# Patient Record
Sex: FEMALE | Race: OTHER
[De-identification: names, ages, dates, MRNs, and addresses within clinical notes are randomized per-mention and may not be internally consistent; named-entity substitution may affect disease eponyms.]

---

## 2018-05-18 ENCOUNTER — HOSPITAL ENCOUNTER (EMERGENCY)
Dept: HOSPITAL 72 - EMR | Age: 83
Discharge: HOME | End: 2018-05-18
Payer: COMMERCIAL

## 2018-05-18 VITALS — DIASTOLIC BLOOD PRESSURE: 82 MMHG | SYSTOLIC BLOOD PRESSURE: 129 MMHG

## 2018-05-18 VITALS — BODY MASS INDEX: 18.14 KG/M2 | HEIGHT: 59 IN | WEIGHT: 90 LBS

## 2018-05-18 VITALS — DIASTOLIC BLOOD PRESSURE: 72 MMHG | SYSTOLIC BLOOD PRESSURE: 132 MMHG

## 2018-05-18 VITALS — SYSTOLIC BLOOD PRESSURE: 127 MMHG | DIASTOLIC BLOOD PRESSURE: 77 MMHG

## 2018-05-18 DIAGNOSIS — Z04.3: Primary | ICD-10-CM

## 2018-05-18 DIAGNOSIS — N28.9: ICD-10-CM

## 2018-05-18 DIAGNOSIS — R41.82: ICD-10-CM

## 2018-05-18 DIAGNOSIS — Z91.81: ICD-10-CM

## 2018-05-18 LAB
ADD MANUAL DIFF: NO
ALBUMIN SERPL-MCNC: 3.6 G/DL (ref 3.4–5)
ALBUMIN/GLOB SERPL: 0.9 {RATIO} (ref 1–2.7)
ALP SERPL-CCNC: 42 U/L (ref 46–116)
ALT SERPL-CCNC: 29 U/L (ref 12–78)
ANION GAP SERPL CALC-SCNC: 8 MMOL/L (ref 5–15)
APPEARANCE UR: CLEAR
APTT BLD: 30 SEC (ref 23–33)
APTT PPP: (no result) S
AST SERPL-CCNC: 34 U/L (ref 15–37)
BASOPHILS NFR BLD AUTO: 1 % (ref 0–2)
BILIRUB SERPL-MCNC: 0.5 MG/DL (ref 0.2–1)
BUN SERPL-MCNC: 31 MG/DL (ref 7–18)
CALCIUM SERPL-MCNC: 10.2 MG/DL (ref 8.5–10.1)
CHLORIDE SERPL-SCNC: 98 MMOL/L (ref 98–107)
CK SERPL-CCNC: 347 U/L (ref 26–308)
CO2 SERPL-SCNC: 29 MMOL/L (ref 21–32)
CREAT SERPL-MCNC: 1.7 MG/DL (ref 0.55–1.3)
EOSINOPHIL NFR BLD AUTO: 1 % (ref 0–3)
ERYTHROCYTE [DISTWIDTH] IN BLOOD BY AUTOMATED COUNT: 11.1 % (ref 11.6–14.8)
GLOBULIN SER-MCNC: 4 G/DL
GLUCOSE UR STRIP-MCNC: NEGATIVE MG/DL
HCT VFR BLD CALC: 43.4 % (ref 37–47)
HGB BLD-MCNC: 14.2 G/DL (ref 12–16)
INR PPP: 1.1 (ref 0.9–1.1)
KETONES UR QL STRIP: NEGATIVE
LEUKOCYTE ESTERASE UR QL STRIP: NEGATIVE
LYMPHOCYTES NFR BLD AUTO: 23.9 % (ref 20–45)
MCV RBC AUTO: 89 FL (ref 80–99)
MONOCYTES NFR BLD AUTO: 10.9 % (ref 1–10)
NEUTROPHILS NFR BLD AUTO: 63.2 % (ref 45–75)
NITRITE UR QL STRIP: NEGATIVE
PH UR STRIP: 5 [PH] (ref 4.5–8)
PLATELET # BLD: 280 K/UL (ref 150–450)
POTASSIUM SERPL-SCNC: 4.3 MMOL/L (ref 3.5–5.1)
PROT UR QL STRIP: NEGATIVE
RBC # BLD AUTO: 4.87 M/UL (ref 4.2–5.4)
SODIUM SERPL-SCNC: 135 MMOL/L (ref 136–145)
SP GR UR STRIP: 1.01 (ref 1–1.03)
UROBILINOGEN UR-MCNC: NORMAL MG/DL (ref 0–1)
WBC # BLD AUTO: 9.1 K/UL (ref 4.8–10.8)

## 2018-05-18 PROCEDURE — 99284 EMERGENCY DEPT VISIT MOD MDM: CPT

## 2018-05-18 PROCEDURE — 85025 COMPLETE CBC W/AUTO DIFF WBC: CPT

## 2018-05-18 PROCEDURE — 84484 ASSAY OF TROPONIN QUANT: CPT

## 2018-05-18 PROCEDURE — 82550 ASSAY OF CK (CPK): CPT

## 2018-05-18 PROCEDURE — 70450 CT HEAD/BRAIN W/O DYE: CPT

## 2018-05-18 PROCEDURE — 80053 COMPREHEN METABOLIC PANEL: CPT

## 2018-05-18 PROCEDURE — 93005 ELECTROCARDIOGRAM TRACING: CPT

## 2018-05-18 PROCEDURE — 85730 THROMBOPLASTIN TIME PARTIAL: CPT

## 2018-05-18 PROCEDURE — 85610 PROTHROMBIN TIME: CPT

## 2018-05-18 PROCEDURE — 36415 COLL VENOUS BLD VENIPUNCTURE: CPT

## 2018-05-18 PROCEDURE — 71045 X-RAY EXAM CHEST 1 VIEW: CPT

## 2018-05-18 PROCEDURE — 96374 THER/PROPH/DIAG INJ IV PUSH: CPT

## 2018-05-18 PROCEDURE — 81003 URINALYSIS AUTO W/O SCOPE: CPT

## 2018-05-18 NOTE — EMERGENCY ROOM REPORT
History of Present Illness


General


Chief Complaint:  Multiple Trauma/Fall


Source:  Patient





Present Illness


HPI


This patient presents from assisted living facility.  There was concern that 

she had fallen in the bathroom this morning.  The fall was not witnessed.  Per 

report, staff found the patient on the floor in the bathroom.  There is was no 

obvious trauma.  The patient herself has no complaints.  There is no other 

history available.


Allergies:  


Coded Allergies:  


     No Known Allergies (Unverified , 5/18/18)





Patient History


Past Medical History:  see triage record, dementia, renal disease, other - Gait 

instability


Social History:  Denies: smoking, alcohol use, drug use


Reviewed Nursing Documentation:  PMH: Agreed; PSxH: Agreed





Nursing Documentation-PMH


Past Medical History:  No History, Except For





Review of Systems


All Other Systems:  negative except mentioned in HPI





Physical Exam





Vital Signs








  Date Time  Temp Pulse Resp B/P (MAP) Pulse Ox O2 Delivery O2 Flow Rate FiO2


 


5/18/18 10:56 97.5 84 16 132/72 97 Room Air  





 97.5       








Sp02 EP Interpretation:  reviewed, normal


General Appearance:  no apparent distress, alert, GCS 15, non-toxic


Head:  normocephalic, atraumatic


Eyes:  bilateral eye normal inspection, bilateral eye PERRL


ENT:  hearing grossly normal, normal pharynx, no angioedema, normal voice


Neck:  full range of motion, supple/symm/no masses


Respiratory:  chest non-tender, lungs clear, normal breath sounds, speaking 

full sentences


Cardiovascular #1:  regular rate, rhythm, no edema


Gastrointestinal:  normal bowel sounds, non tender, soft, non-distended, no 

guarding, no rebound


Rectal:  deferred


Musculoskeletal:  back normal, gait/station normal, normal range of motion, non-

tender


Neurologic:  alert, oriented x3, responsive, motor strength/tone normal, 

sensory intact, speech normal


Psychiatric:  judgement/insight normal, memory normal, mood/affect normal, no 

suicidal/homicidal ideation


Skin:  normal color, no rash, warm/dry, well hydrated





Medical Decision Making


Diagnostic Impression:  


 Primary Impression:  


 Fall


 Additional Impression:  


 Renal insufficiency, mild


ER Course


This elderly female presents for evaluation after a fall in the bathroom.  She 

is a very well-appearing without any evidence of long bone or bony fracture of 

any sort.  My physical exam is benign.  There is no evidence of head trauma, 

however, given the patient's age I did obtain a CT of the head.  The patient's 

laboratory workup is at her baseline.  She does have renal insufficiency but 

this is chronic for her.  The patient's family is bedside.  At this time, I did 

not identify an emergency medical condition or significant injury from her 

fall.  She is returned to the assisted living facility.





Laboratory Tests








Test


  5/18/18


11:30 5/18/18


12:10


 


White Blood Count


  9.1 K/UL


(4.8-10.8) 


 


 


Red Blood Count


  4.87 M/UL


(4.20-5.40) 


 


 


Hemoglobin


  14.2 G/DL


(12.0-16.0) 


 


 


Hematocrit


  43.4 %


(37.0-47.0) 


 


 


Mean Corpuscular Volume 89 FL (80-99)   


 


Mean Corpuscular Hemoglobin


  29.1 PG


(27.0-31.0) 


 


 


Mean Corpuscular Hemoglobin


Concent 32.6 G/DL


(32.0-36.0) 


 


 


Red Cell Distribution Width


  11.1 %


(11.6-14.8)  L 


 


 


Platelet Count


  280 K/UL


(150-450) 


 


 


Mean Platelet Volume


  6.7 FL


(6.5-10.1) 


 


 


Neutrophils (%) (Auto)


  63.2 %


(45.0-75.0) 


 


 


Lymphocytes (%) (Auto)


  23.9 %


(20.0-45.0) 


 


 


Monocytes (%) (Auto)


  10.9 %


(1.0-10.0)  H 


 


 


Eosinophils (%) (Auto)


  1.0 %


(0.0-3.0) 


 


 


Basophils (%) (Auto)


  1.0 %


(0.0-2.0) 


 


 


Prothrombin Time


  12.0 SEC


(9.30-11.50)  H 


 


 


Prothrombin Time INR 1.1 (0.9-1.1)   


 


PTT


  30 SEC (23-33)


  


 


 


Sodium Level


  135 MMOL/L


(136-145)  L 


 


 


Potassium Level


  4.3 MMOL/L


(3.5-5.1) 


 


 


Chloride Level


  98 MMOL/L


() 


 


 


Carbon Dioxide Level


  29 MMOL/L


(21-32) 


 


 


Anion Gap


  8 mmol/L


(5-15) 


 


 


Blood Urea Nitrogen


  31 mg/dL


(7-18)  H 


 


 


Creatinine


  1.7 MG/DL


(0.55-1.30)  H 


 


 


Estimate Glomerular


Filtration Rate  mL/min (>60)  


  


 


 


Glucose Level


  134 MG/DL


()  H 


 


 


Calcium Level


  10.2 MG/DL


(8.5-10.1)  H 


 


 


Total Bilirubin


  0.5 MG/DL


(0.2-1.0) 


 


 


Aspartate Amino Transferase


(AST) 34 U/L (15-37)


  


 


 


Alanine Aminotransferase (ALT)


  29 U/L (12-78)


  


 


 


Alkaline Phosphatase


  42 U/L


()  L 


 


 


Total Creatine Kinase


  347 U/L


()  H 


 


 


Troponin I


  0.000 ng/mL


(0.000-0.056) 


 


 


Total Protein


  7.6 G/DL


(6.4-8.2) 


 


 


Albumin


  3.6 G/DL


(3.4-5.0) 


 


 


Globulin 4.0 g/dL   


 


Albumin/Globulin Ratio


  0.9 (1.0-2.7)


L 


 


 


Urine Color  Pale yellow  


 


Urine Appearance  Clear  


 


Urine pH  5 (4.5-8.0)  


 


Urine Specific Gravity


  


  1.015


(1.005-1.035)


 


Urine Protein


  


  Negative


(NEGATIVE)


 


Urine Glucose (UA)


  


  Negative


(NEGATIVE)


 


Urine Ketones


  


  Negative


(NEGATIVE)


 


Urine Occult Blood


  


  Negative


(NEGATIVE)


 


Urine Nitrite


  


  Negative


(NEGATIVE)


 


Urine Bilirubin


  


  Negative


(NEGATIVE)


 


Urine Urobilinogen


  


  Normal MG/DL


(0.0-1.0)


 


Urine Leukocyte Esterase


  


  Negative


(NEGATIVE)








EKG Diagnostic Results


Rate:  normal


Rhythm:  NSR


ST Segments:  no acute changes





Rhythm Strip Diag. Results


EP Interpretation:  yes


Rate:  80's


Rhythm:  NSR, no PVC's, no ectopy





CT/MRI/US Diagnostic Results


CT/MRI/US Diagnostic Results :  


   Imaging Test Ordered:  CT head


   Impression


No acute findings.  See official report for incidental findings.





Last Vital Signs








  Date Time  Temp Pulse Resp B/P (MAP) Pulse Ox O2 Delivery O2 Flow Rate FiO2


 


5/18/18 11:14 97.5  16 132/72 97 Room Air  





 97.5       


 


5/18/18 10:56  84      








Status:  improved


Disposition:  HOME, SELF-CARE


Condition:  Improved


Referrals:  


NON PHYSICIAN (PCP)











CHIDI HERNANDEZ D.O. May 18, 2018 14:31

## 2018-05-21 NOTE — CARDIOLOGY REPORT
--------------- APPROVED REPORT --------------





EKG Measurement

Heart [id number]

CA [id number]

[id number]

[id number]

[id number]





Normal sinus rhythm

Possible Inferior infarct, age undetermined

Abnormal ECG

## 2020-02-12 ENCOUNTER — HOSPITAL ENCOUNTER (EMERGENCY)
Dept: HOSPITAL 72 - EMR | Age: 85
Discharge: NURSING, REHAB OR LONG-TERM CARE | End: 2020-02-12
Payer: COMMERCIAL

## 2020-02-12 VITALS — DIASTOLIC BLOOD PRESSURE: 61 MMHG | SYSTOLIC BLOOD PRESSURE: 127 MMHG

## 2020-02-12 VITALS — SYSTOLIC BLOOD PRESSURE: 121 MMHG | DIASTOLIC BLOOD PRESSURE: 60 MMHG

## 2020-02-12 VITALS — HEIGHT: 64 IN | BODY MASS INDEX: 12.8 KG/M2 | WEIGHT: 75 LBS

## 2020-02-12 DIAGNOSIS — F03.90: ICD-10-CM
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
## 2020-02-12 DIAGNOSIS — M54.5: ICD-10-CM

## 2020-02-12 DIAGNOSIS — N30.00: ICD-10-CM

## 2020-02-12 DIAGNOSIS — M19.90: ICD-10-CM
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
## 2020-02-12 DIAGNOSIS — G89.29: Primary | ICD-10-CM

## 2020-02-12 DIAGNOSIS — R91.8: ICD-10-CM

## 2020-02-12 LAB
ADD MANUAL DIFF: NO
ALBUMIN SERPL-MCNC: 3.3 G/DL (ref 3.4–5)
ALBUMIN/GLOB SERPL: 0.8 {RATIO} (ref 1–2.7)
ALP SERPL-CCNC: 36 U/L (ref 46–116)
ALT SERPL-CCNC: 23 U/L (ref 12–78)
ANION GAP SERPL CALC-SCNC: 9 MMOL/L (ref 5–15)
APPEARANCE UR: CLEAR
APTT BLD: 27 SEC (ref 23–33)
APTT PPP: (no result) S
AST SERPL-CCNC: 31 U/L (ref 15–37)
BASOPHILS NFR BLD AUTO: 0.6 % (ref 0–2)
BILIRUB SERPL-MCNC: 0.5 MG/DL (ref 0.2–1)
BUN SERPL-MCNC: 31 MG/DL (ref 7–18)
CALCIUM SERPL-MCNC: 9.2 MG/DL (ref 8.5–10.1)
CHLORIDE SERPL-SCNC: 107 MMOL/L (ref 98–107)
CO2 SERPL-SCNC: 24 MMOL/L (ref 21–32)
CREAT SERPL-MCNC: 1.4 MG/DL (ref 0.55–1.3)
EOSINOPHIL NFR BLD AUTO: 1.6 % (ref 0–3)
ERYTHROCYTE [DISTWIDTH] IN BLOOD BY AUTOMATED COUNT: 11.8 % (ref 11.6–14.8)
GLOBULIN SER-MCNC: 4.2 G/DL
GLUCOSE UR STRIP-MCNC: NEGATIVE MG/DL
HCT VFR BLD CALC: 43.4 % (ref 37–47)
HGB BLD-MCNC: 14.9 G/DL (ref 12–16)
INR PPP: 1.1 (ref 0.9–1.1)
KETONES UR QL STRIP: NEGATIVE
LEUKOCYTE ESTERASE UR QL STRIP: (no result)
LYMPHOCYTES NFR BLD AUTO: 25.3 % (ref 20–45)
MCV RBC AUTO: 90 FL (ref 80–99)
MONOCYTES NFR BLD AUTO: 7.9 % (ref 1–10)
NEUTROPHILS NFR BLD AUTO: 64.6 % (ref 45–75)
NITRITE UR QL STRIP: POSITIVE
PH UR STRIP: 5 [PH] (ref 4.5–8)
PLATELET # BLD: 246 K/UL (ref 150–450)
POTASSIUM SERPL-SCNC: 4.3 MMOL/L (ref 3.5–5.1)
PROT UR QL STRIP: (no result)
RBC # BLD AUTO: 4.81 M/UL (ref 4.2–5.4)
SODIUM SERPL-SCNC: 140 MMOL/L (ref 136–145)
SP GR UR STRIP: 1.02 (ref 1–1.03)
UROBILINOGEN UR-MCNC: NORMAL MG/DL (ref 0–1)
WBC # BLD AUTO: 9.7 K/UL (ref 4.8–10.8)

## 2020-02-12 PROCEDURE — 81003 URINALYSIS AUTO W/O SCOPE: CPT

## 2020-02-12 PROCEDURE — 74176 CT ABD & PELVIS W/O CONTRAST: CPT

## 2020-02-12 PROCEDURE — 80053 COMPREHEN METABOLIC PANEL: CPT

## 2020-02-12 PROCEDURE — 87181 SC STD AGAR DILUTION PER AGT: CPT

## 2020-02-12 PROCEDURE — 36415 COLL VENOUS BLD VENIPUNCTURE: CPT

## 2020-02-12 PROCEDURE — 85025 COMPLETE CBC W/AUTO DIFF WBC: CPT

## 2020-02-12 PROCEDURE — 83690 ASSAY OF LIPASE: CPT

## 2020-02-12 PROCEDURE — 70450 CT HEAD/BRAIN W/O DYE: CPT

## 2020-02-12 PROCEDURE — 87086 URINE CULTURE/COLONY COUNT: CPT

## 2020-02-12 PROCEDURE — 71045 X-RAY EXAM CHEST 1 VIEW: CPT

## 2020-02-12 PROCEDURE — 99284 EMERGENCY DEPT VISIT MOD MDM: CPT

## 2020-02-12 PROCEDURE — 96365 THER/PROPH/DIAG IV INF INIT: CPT

## 2020-02-12 PROCEDURE — 96361 HYDRATE IV INFUSION ADD-ON: CPT

## 2020-02-12 PROCEDURE — 85730 THROMBOPLASTIN TIME PARTIAL: CPT

## 2020-02-12 PROCEDURE — 85610 PROTHROMBIN TIME: CPT

## 2020-02-12 PROCEDURE — 96375 TX/PRO/DX INJ NEW DRUG ADDON: CPT

## 2020-02-12 NOTE — NUR
ED Nurse Note:

patient brought into ED from [facility] by ambulance 
[facility] unit [facility], per sons, patient is s/p fall and c/o back pain. per son, 
patient has been having multiple falls recently. patient is alert awake, on a 
hospital gown, on a cardiac monitor. 

maria teresa number: [phone]

## 2020-02-12 NOTE — DIAGNOSTIC IMAGING REPORT
Indications: Mechanical fall, head trauma, dementia,

 

Technique: Spiral acquisitions obtained through the brain. Angled axial and coronal 5

x 5 mm slices were reconstructed. Total dose length product 1018 mGycm.  CTDI vol(s)

53 mGy. Dose reduction achieved using automated exposure control

 

Comparison: 5/18/2018

 

Findings: Again demonstrated is age-related enlargement of the ventricles and extra

axial CSF spaces. There is periventricular deep white matter low-attenuation,

consistent with chronic microvascular ischemic change. Unusual extensive very dense

mostly extra-axial punctate densities are again noted, particularly at the skull

base. In the foramen magnum.. No acute intracranial hemorrhage or edema. No mass

effect nor midline shift. Visualized orbits are unremarkable. There is sphenoid sinus

disease. The mastoids are clear. The calvarium is intact. There is no significant

interim change

 

Impression: Chronic and age-related changes

 

Negative for acute intracranial bleed or mass effect

 

Unusual extensive mostly extra-axial densities, as described. While possibly

representing meningeal calcifications, this could also represent fat soluble

contrast, deposits from prior myelography

 

 

 

 

 

The CT scanner at [facility] is accredited by the American College of

Radiology and the scans are performed using protocols designed to limit radiation

exposure to as low as reasonably achievable to attain images of sufficient resolution

adequate for diagnostic evaluation.

## 2020-02-12 NOTE — EMERGENCY ROOM REPORT
History of Present Illness


General


Chief Complaint:  Multiple Trauma/Fall


Source:  Patient, Medical Record, EMS





Present Illness


HPI


84-year-old female presents with mechanical fall, history is limited patient 

has a history of many falls, dementia, is alert and oriented x1, apparently 

fell yesterday, unknown if patient hit head, patient is only complaining of 

back pain and points at the left lower back no known aggravating relieving 

factors severity is mild, intermittent patient is able to ambulate patient 

presents from nursing home with her sons


Allergies:  


Coded Allergies:  


     No Known Allergies (Unverified , 5/18/18)





Patient History


Limited by:  medical condition - Dementia


Past Medical History:  see triage record


Last Menstrual Period:  na


Reviewed Nursing Documentation:  PMH: Agreed; PSxH: Agreed





Nursing Documentation-PMH


Past Medical History:  No History, Except For


Hx Cardiac Problems:  No - osteoarthritis





Review of Systems


All Other Systems:  limited - Dementia





Physical Exam





Vital Signs








  Date Time  Temp Pulse Resp B/P (MAP) Pulse Ox O2 Delivery O2 Flow Rate FiO2


 


2/12/20 11:47 98.2 91 19 120/70 (87) 97 Room Air  








Sp02 EP Interpretation:  reviewed, normal


General Appearance:  well appearing, no apparent distress, alert


Head:  normocephalic, atraumatic


Eyes:  bilateral eye PERRL, bilateral eye EOMI


ENT:  uvula midline, moist mucus membranes


Neck:  supple, thyroid normal, no bony tend, supple/symm/no masses


Respiratory:  lungs clear, no respiratory distress, no retraction, no accessory 

muscle use


Cardiovascular #1:  normal peripheral pulses, regular rate, rhythm, no edema, 

no gallop, no murmur


Gastrointestinal:  non tender, soft, no guarding, no rebound


Musculoskeletal:  other - Left lower  to palpation no midline 

tenderness no step-offs


Neurologic:  alert, responsive - Responsive but confused


Psychiatric:  mood/affect normal


Skin:  no rash, warm/dry





Medical Decision Making


Diagnostic Impression:  


 Primary Impression:  


 Fall


 Qualified Codes:  W19.XXXA - Unspecified fall, initial encounter


 Additional Impressions:  


 Low back pain


 Qualified Codes:  M54.5 - Low back pain; G89.29 - Other chronic pain


 Lung nodule, multiple


 UTI (urinary tract infection)


 Qualified Codes:  N30.00 - Acute cystitis without hematuria


ER Course


84-year-old female history of dementia history of multiple falls in the past 

presents with mechanical fall left lower back pain not midline differential 

diagnosis includes contusion, fracture, dislocation


Labs show UTI, patient with incidental lung nodule findings, patient's son was 

made aware


CT imaging negative will discharge patient back to SNF


Dara MONTANO spoke with kendy Early at 3:07pm, son wants patient discharged to 

specific SNF, niece and son state they want ER to arrange placement into new 

SNF location, sons were counseled that she can be discharged back to assisted 

living, and that she can be transferred to a SNF of their choosing as an 

outpatient. 


Disposition home with return precautions follow-up with PCP





Laboratory Tests








Test


  2/12/20


12:00 2/12/20


12:10


 


White Blood Count


  9.7 K/UL


(4.8-10.8) 


 


 


Red Blood Count


  4.81 M/UL


(4.20-5.40) 


 


 


Hemoglobin


  14.9 G/DL


(12.0-16.0) 


 


 


Hematocrit


  43.4 %


(37.0-47.0) 


 


 


Mean Corpuscular Volume 90 FL (80-99)   


 


Mean Corpuscular Hemoglobin


  31.1 PG


(27.0-31.0)  H 


 


 


Mean Corpuscular Hemoglobin


Concent 34.5 G/DL


(32.0-36.0) 


 


 


Red Cell Distribution Width


  11.8 %


(11.6-14.8) 


 


 


Platelet Count


  246 K/UL


(150-450) 


 


 


Mean Platelet Volume


  7.0 FL


(6.5-10.1) 


 


 


Neutrophils (%) (Auto)


  64.6 %


(45.0-75.0) 


 


 


Lymphocytes (%) (Auto)


  25.3 %


(20.0-45.0) 


 


 


Monocytes (%) (Auto)


  7.9 %


(1.0-10.0) 


 


 


Eosinophils (%) (Auto)


  1.6 %


(0.0-3.0) 


 


 


Basophils (%) (Auto)


  0.6 %


(0.0-2.0) 


 


 


Prothrombin Time


  11.4 SEC


(9.30-11.50) 


 


 


Prothrombin Time INR 1.1 (0.9-1.1)   


 


Activated Partial


Thromboplast Time 27 SEC (23-33)


  


 


 


Sodium Level


  140 MMOL/L


(136-145) 


 


 


Potassium Level


  4.3 MMOL/L


(3.5-5.1) 


 


 


Chloride Level


  107 MMOL/L


() 


 


 


Carbon Dioxide Level


  24 MMOL/L


(21-32) 


 


 


Anion Gap


  9 mmol/L


(5-15) 


 


 


Blood Urea Nitrogen


  31 mg/dL


(7-18)  H 


 


 


Creatinine


  1.4 MG/DL


(0.55-1.30)  H 


 


 


Estimate Glomerular


Filtration Rate 35.9 mL/min


(>60) 


 


 


Glucose Level


  87 MG/DL


() 


 


 


Calcium Level


  9.2 MG/DL


(8.5-10.1) 


 


 


Total Bilirubin


  0.5 MG/DL


(0.2-1.0) 


 


 


Aspartate Amino Transferase


(AST) 31 U/L (15-37)


  


 


 


Alanine Aminotransferase (ALT)


  23 U/L (12-78)


  


 


 


Alkaline Phosphatase


  36 U/L


()  L 


 


 


Total Protein


  7.5 G/DL


(6.4-8.2) 


 


 


Albumin


  3.3 G/DL


(3.4-5.0)  L 


 


 


Globulin 4.2 g/dL   


 


Albumin/Globulin Ratio


  0.8 (1.0-2.7)


L 


 


 


Lipase


  142 U/L


() 


 


 


Urine Color  Gail  


 


Urine Appearance  Clear  


 


Urine pH  5 (4.5-8.0)  


 


Urine Specific Gravity


  


  1.020


(1.005-1.035)


 


Urine Protein


  


  1+ (NEGATIVE)


H


 


Urine Glucose (UA)


  


  Negative


(NEGATIVE)


 


Urine Ketones


  


  Negative


(NEGATIVE)


 


Urine Blood


  


  2+ (NEGATIVE)


H


 


Urine Nitrite


  


  Positive


(NEGATIVE)  H


 


Urine Bilirubin


  


  Negative


(NEGATIVE)


 


Urine Ictotest


  


  Negative


(NEGATIVE)


 


Urine Urobilinogen


  


  Normal MG/DL


(0.0-1.0)


 


Urine Leukocyte Esterase


  


  1+ (NEGATIVE)


H


 


Urine RBC


  


  0-2 /HPF (0 -


2)


 


Urine WBC


  


  5-10 /HPF (0 -


2)  H


 


Urine Squamous Epithelial


Cells 


  Occasional


/LPF


 


Urine Bacteria


  


  Many /HPF


(NONE)  H








CT/MRI/US Diagnostic Results


CT/MRI/US Diagnostic Results :  


   Impression


Procedure: CT Abdomen Pelvis WO Contrast


Indication: Pain, trauma, fall


 


Technique: Spiral acquisitions obtained through the abdomen and pelvis. No oral


contrast utilized, per emergency room physician request No IV contrast utilized

,  per


referring physician request.. Multiplanar reconstructions were generated. Total 

dose


length product 143 mGycm. CTDIvol(s) 3 mGy. Dose reduction achieved using 

automated


exposure control


 


 


Comparison: None


 


Findings: The bones demonstrate no evidence of fracture. There is stranding of 

the


subcutaneous fat in the left hip region, could represent an area of soft tissue


contusion. No retroperitoneal, intra-abdominal, or intrapelvic hematoma 

demonstrated.


There is thoracolumbar scoliotic deformity and degenerative lumbosacral 

spondylosis.


High attenuation material is seen in the posterior sacral epidural space, likely


representing residual fat-soluble contrast from prior myelographic injection.


Scattered foci of similar-appearing material are also seen within the spinal 

canal


 


Lack of IV contrast limits assessment of the solid organs. The liver is grossly


unremarkable. The gallbladder is nondistended. No biliary ductal dilatation. The


pancreas, spleen, adrenals, kidneys are unremarkable. No renal or ureteral 

calculi,


hydronephrosis, or hydroureter demonstrated. No pelvic mass or adenopathy. The 

uterus


and adnexal structures are unremarkable. The bladder contains a Coelho catheter.


However, the bladder is mildly distended despite the presence of the Coelho 

catheter.


 


The rectum is mildly to moderately distended with stool, measures up to 7 cm in


diameter. There are a few scattered colonic diverticula. No evidence of acute


diverticulitis demonstrated. The appendix is normal. No small bowel distention. 

No


free or loculated intraperitoneal gas or fluid is evident. The distal esophagus 

and


stomach are unremarkable. There is a duodenal diverticulum. There is some 

increased


attenuation of the fat of the mesenteric root to the left of midline, and 

prominent


lymph nodes are seen in this area. This does not appear to be associated with 

the


pancreatic tail. There is an accessory splenule noted.


 


The included lung bases demonstrate posterior dependent atelectatic changes.


 


Impression: No acute bony, soft tissue, or solid organ trauma demonstrated. Note

,


however, that assessment for the latter is somewhat limited in the absence of IV


contrast


 


Increased attenuation of the fat of the mesenteric root to the left of midline. 

This


could represent an area of inflammation, nonspecific as regards etiology. 

Dominant


lymph nodes in the same area may reflect associated reactive lymphadenopathy.


 


Mild rectal distention with stool, rectal fecal impaction not excludable


 


Coelho catheter in place. Distention of the bladder despite the presence of the 

Coelho


catheter


 


Lumbar scoliotic deformity and degenerative lumbosacral spondylosis


 


High attenuation material within the sacral epidural space and within the 

thoracic


and lumbar spinal canal, likely residual contrast from prior myelographic 

injection


with fat soluble contrast


 


Incidental findings as noted, including posterior dependent pulmonary 

atelectatic


changes, accessory splenule, duodenal diverticulum


 


 


 


The CT scanner at [facility] is accredited by the American College 

of


Radiology and the scans are performed using protocols designed to limit 

radiation


exposure to as low as reasonably achievable to attain images of sufficient 

resolution


adequate for diagnostic evaluation.


 














Dictated By: William Gould MD   


Electronically Signed By: William Gould MD   


Signed Date/Time 02/12/20 1449   








CC: Alejandro Zapata MD








Procedure: CT Head no Contrast


Indications: Mechanical fall, head trauma, dementia,


 


Technique: Spiral acquisitions obtained through the brain. Angled axial and 

coronal 5


x 5 mm slices were reconstructed. Total dose length product 1018 mGycm.  CTDI 

vol(s)


53 mGy. Dose reduction achieved using automated exposure control


 


Comparison: 5/18/2018


 


Findings: Again demonstrated is age-related enlargement of the ventricles and 

extra


axial CSF spaces. There is periventricular deep white matter low-attenuation,


consistent with chronic microvascular ischemic change. Unusual extensive very 

dense


mostly extra-axial punctate densities are again noted, particularly at the skull


base. In the foramen magnum.. No acute intracranial hemorrhage or edema. No mass


effect nor midline shift. Visualized orbits are unremarkable. There is sphenoid 

sinus


disease. The mastoids are clear. The calvarium is intact. There is no 

significant


interim change


 


Impression: Chronic and age-related changes


 


Negative for acute intracranial bleed or mass effect


 


Unusual extensive mostly extra-axial densities, as described. While possibly


representing meningeal calcifications, this could also represent fat soluble


contrast, deposits from prior myelography


 


 


 


 


 


The CT scanner at [facility] is accredited by the American College 

of


Radiology and the scans are performed using protocols designed to limit 

radiation


exposure to as low as reasonably achievable to attain images of sufficient 

resolution


adequate for diagnostic evaluation.


 














Dictated By: William Gould MD   


Electronically Signed By: William Gould MD   


Signed Date/Time 02/12/20 142   








CC: Alejandro Zapata MD





Last Vital Signs








  Date Time  Temp Pulse Resp B/P (MAP) Pulse Ox O2 Delivery O2 Flow Rate FiO2


 


2/12/20 11:47 98.2 91 19 120/70 (87) 97 Room Air  








Disposition:  XFER SNF


Condition:  Stable


Scripts


Acetaminophen* (ACETAMINOPHEN EXTRA STRENGTH*) 500 Mg Tablet


500 MG ORAL Q6H PRN for For Pain, #20 TAB


   Prov: Alejandro Zapata MD         2/12/20 


Lidocaine Patch* (Lidoderm Patch*) 1 Each Adh..patch


1 PATCH TOPIC DAILY, #7 PATCH 0 Refills


   Patch(es) may remain in place for up to 12 hours in any 24-hour


   period.


   Prov: Alejandro Zapata MD         2/12/20 


Cephalexin* (KEFLEX*) 500 Mg Capsule


500 MG ORAL EVERY 6 HOURS, #28 CAP


   Prov: Alejandro Zapata MD         2/12/20


Referrals:  


[facility]





[facility]


Patient Instructions:  Back Pain, Adult, Easy-to-Read, Urinary Tract Infection, 

Easy-to-Read





Additional Instructions:  


The patient was provided with discharge instructions, notified to follow-up 

with a primary care doctor and or specialist in the next 24-48 hours, and to 

return to the ED if they have worsening of their symptoms. 





Please note that this report is being documented using DRAGON technology.


This can lead to erroneous entry secondary to incorrect interpretation by the 

dictating instrument. 

















Procedure: XRAY Chest 1v


Indication: Chest


 


Technique: One view of the chest


 


Comparison: 11/30/2019


 


Findings: There are 2 nodules in the right lung, each measuring approximately 1 

cm


diameter, not evident previously. Along the pleural spaces are otherwise clear. 

The


heart size is normal. The aorta is calcified


 


Impression: 2 right lung nodules, not evident on prior 11/30/2019 study. 

Recommend CT


for further evaluation 








PLEASE OBTAIN OUTPATIENT CT CHEST NON EMERGENT TO EVALUATE NODULES











Alejandro Zapata MD Feb 12, 2020 12:08

## 2020-02-12 NOTE — NUR
ER DISCHARGE NOTE:

Patient is cleared to be discharged per ELI FLANAGAN, pt is aox4, on room 
air, with stable vital signs. pt was given dc and prescription instructions, 
faxed to Paola MONTANO to [facility] and also given to [facility] EMTs.  
pt id band and iv site removed without complications. pt took all belongings.

## 2020-02-12 NOTE — DIAGNOSTIC IMAGING REPORT
Indication: Chest

 

Technique: One view of the chest

 

Comparison: 11/30/2019

 

Findings: There are 2 nodules in the right lung, each measuring approximately 1 cm

diameter, not evident previously. Along the pleural spaces are otherwise clear. The

heart size is normal. The aorta is calcified

 

Impression: 2 right lung nodules, not evident on prior 11/30/2019 study. Recommend CT

for further evaluation

 

No acute process otherwise

## 2020-02-12 NOTE — NUR
ED Nurse Note:



patient is being transferred via [facility] ambulance Unit # [id number] to [facility] with all of her belongings, in stable condition.

report given to Paola Cunha that patient is going back to facility. endorsed all 
plan of care to Paola CUNHA.

## 2020-02-12 NOTE — DIAGNOSTIC IMAGING REPORT
Indication: Pain, trauma, fall

 

Technique: Spiral acquisitions obtained through the abdomen and pelvis. No oral

contrast utilized, per emergency room physician request No IV contrast utilized,  per

referring physician request.. Multiplanar reconstructions were generated. Total dose

length product 143 mGycm. CTDIvol(s) 3 mGy. Dose reduction achieved using automated

exposure control

 

 

Comparison: None

 

Findings: The bones demonstrate no evidence of fracture. There is stranding of the

subcutaneous fat in the left hip region, could represent an area of soft tissue

contusion. No retroperitoneal, intra-abdominal, or intrapelvic hematoma demonstrated.

There is thoracolumbar scoliotic deformity and degenerative lumbosacral spondylosis.

High attenuation material is seen in the posterior sacral epidural space, likely

representing residual fat-soluble contrast from prior myelographic injection.

Scattered foci of similar-appearing material are also seen within the spinal canal

 

Lack of IV contrast limits assessment of the solid organs. The liver is grossly

unremarkable. The gallbladder is nondistended. No biliary ductal dilatation. The

pancreas, spleen, adrenals, kidneys are unremarkable. No renal or ureteral calculi,

hydronephrosis, or hydroureter demonstrated. No pelvic mass or adenopathy. The uterus

and adnexal structures are unremarkable. The bladder contains a Coelho catheter.

However, the bladder is mildly distended despite the presence of the Coelho catheter.

 

The rectum is mildly to moderately distended with stool, measures up to 7 cm in

diameter. There are a few scattered colonic diverticula. No evidence of acute

diverticulitis demonstrated. The appendix is normal. No small bowel distention. No

free or loculated intraperitoneal gas or fluid is evident. The distal esophagus and

stomach are unremarkable. There is a duodenal diverticulum. There is some increased

attenuation of the fat of the mesenteric root to the left of midline, and prominent

lymph nodes are seen in this area. This does not appear to be associated with the

pancreatic tail. There is an accessory splenule noted.

 

The included lung bases demonstrate posterior dependent atelectatic changes.

 

Impression: No acute bony, soft tissue, or solid organ trauma demonstrated. Note,

however, that assessment for the latter is somewhat limited in the absence of IV

contrast

 

Increased attenuation of the fat of the mesenteric root to the left of midline. This

could represent an area of inflammation, nonspecific as regards etiology. Dominant

lymph nodes in the same area may reflect associated reactive lymphadenopathy.

 

Mild rectal distention with stool, rectal fecal impaction not excludable

 

Coelho catheter in place. Distention of the bladder despite the presence of the Coelho

catheter

 

Lumbar scoliotic deformity and degenerative lumbosacral spondylosis

 

High attenuation material within the sacral epidural space and within the thoracic

and lumbar spinal canal, likely residual contrast from prior myelographic injection

with fat soluble contrast

 

Incidental findings as noted, including posterior dependent pulmonary atelectatic

changes, accessory splenule, duodenal diverticulum

 

 

 

The CT scanner at [facility] is accredited by the [employer]

[employer] and the scans are performed using protocols designed to limit radiation

exposure to as low as reasonably achievable to attain images of sufficient resolution

adequate for diagnostic evaluation.

## 2020-02-12 NOTE — NUR
ED Nurse Note:



spoke with the son and the niece Sharath Chin) over the phone, informed them 
that the patient is cleared for discharge back to assisted living place or 
home. The niece stated that he will check for placement and call us back.